# Patient Record
(demographics unavailable — no encounter records)

---

## 2024-11-13 NOTE — DISCUSSION/SUMMARY
[FreeTextEntry1] : Continue tacrolimus 0.03% ointment pea-size amt to areas of ELP, LS 1-2x/week. Instructed to apply lidocaine 5% ointment with tacrolimus  Lidocaine 5% ointment to area as needed -can use up to TID as needed (ie: after dilation) -apply 5 mins prior to dilation  Increase estrace ftp to introitus 5x/week (nights not using vagifem).  Continue Vagifem 10mcg 1 tab PV to 1-2x/week Continue hydrocortisone 25mg supp 1 PV 2x/month prn only. May use clobetasol 1-2x/week.  Encouraged to resume dilation therapy with #2 at this time and dilate 3-4x/week as tolerated. May use topical lidocaine prior to and after dilation.  RTO 6 months

## 2024-11-13 NOTE — HISTORY OF PRESENT ILLNESS
[FreeTextEntry1] : Dali is here for a f/u visit. She reports some improvement in her ELP and vLS. She is using vagifem PV 3x/week, estrace ftp to introitus 3-4x/weel, tacrolimus 1-2x/week and hydrocortisone suppositories PV 2x/month as needed only.  She continues to report irritation after tacrolimus application x 1-2d. She is not using hyaluronic acid 5mg suppositories.   She has only dilated x 1-2 since her last visit.  Vaginal dryness improved. She enodrses mild irritation b/t 4-6 o'clock. She denies vLS or ELP flares.  Dali had TV and abdominal US - wnl

## 2024-11-13 NOTE — PHYSICAL EXAM
[No Acute Distress] : in no acute distress [Well developed] : well developed [Well Nourished] : ~L well nourished [Good Hygeine] : demonstrates good hygeine [Oriented x3] : oriented to person, place, and time [Normal Memory] : ~T memory was ~L unimpaired [Normal Mood/Affect] : mood and affect are normal [Warm and Dry] : was warm and dry to touch [Normal Gait] : gait was normal [No Lesions] : no lesions were seen on the external genitalia [Vulvar Atrophy] : vulvar atrophy [Normal] : was normal [Normal Appearance] : general appearance was normal [Dry Mucosa] : dry mucosa [Rectocele] : a rectocele [No Bleeding] : there was no active vaginal bleeding [Exam Deferred] : was deferred [de-identified] : Q-tip touch test negative. +paleness at introitus, +mucosal web, non-tender. Mild erythema at inferior aspect of introitus at 4-6 o'clock. No ulcerations, erosions, fissures. Derm changes c/w LS and ELP however has significantly improved.  [de-identified] : Derm changes c/w LS. 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  [FreeTextEntry4] : no active ELP; PFMs wnl, supple, non-tender to palpation; restrictive ring not appreciated.

## 2025-02-07 NOTE — HISTORY OF PRESENT ILLNESS
[FreeTextEntry1] : Patient with a history of hypothyroidism history of colon polyps and hemorrhoid  Patient has known history of GERD.  Gets epigastric pain worse in the morning.  We have tried famotidine 40 mg a day.  Last visit she was under control, but since last visit she is getting increasing epigastric pain.  Primary has changed her to pantoprazole 40 mg a day.  EGD in September 2024 showed gastritis.  Also showed gastric intestinal metaplasia.  Due for gastric mapping EGD in 1 year.  September 2025.  Patient was on Wegovy.  Noted that it gave her constipation.  Was having thin stools.  Tried MiraLAX but gives fecal urgency.  She is using MiraLAX 3 times a week and giving a bowel movement 3 times a week.  However she still complains of incomplete evacuation she was complaining of abdominal discomfort.  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  It also showed a 5 mm pancreatic cyst.  MRCP was negative for pancreatic cyst.  EGD September 2024 showed internal hemorrhoids.  She does have a remote history of colon polyps in 2022.

## 2025-02-07 NOTE — ASSESSMENT
[FreeTextEntry1] : A/P Patient with constipation, incomplete evacuation Will try MiraLAX half a capful every day instead of 3 times a day. If no improvement may consider Amitiza 8 mcg a day. History of colon polyps-last colonoscopy 2024 was negative for colon polyps.  Colonoscopy due 2029  Patient with epigastric pain, EGD September 2024 showing gastric intestinal metaplasia. She is now off Pepcid 40 mg and is on pantoprazole 40 mg a day. After her symptoms are controlled, will we will reconsider switching back to Pepcid.  She is concerned about her osteopenia. Will do EGD in September 2025 for gastric mapping Follow-up in 3 months

## 2025-05-21 NOTE — ASSESSMENT
[FreeTextEntry1] : Right knee. History of Mild pf oa. Success with Orthovisc in the past. Increased pain recently with no injury.  This is an exacerbation of a chronic condition.  Left knee pain. MRI left knee 2022 shows pf oa mild, some fat pad overgrowth. Success with Orthovisc in the past.  Increased pain recently with no injury.   Patient daughter getting  in two weeks and patient going to [place] mid June. history of sjogrens - seeing rheum

## 2025-05-21 NOTE — HISTORY OF PRESENT ILLNESS
[Dull/Aching] : dull/aching [Exercising] : exercising [6] : 6 [0] : 0 [de-identified] : 6/15/22:  bilateral knee pain ongoing but getting worse since may 2022.  no new injury or trauma.  no fevers or chills.  left knee worse than right.  pain worse with stairs, bending, shifting positions, twisting, walking.  reports to clicking and also buckling dharmesh of the left knee.  rest, ice, hep, otc meds prn with little relief.  7/20/22: still pain mostly left knee. 8/24/22: improved with hep and pt. 10/26/22:  bilateral knee pain still.  11/2/22:  knee pain continues.  no adverse reactions from first set.  11/9/22:  knee pain still. 11/16/22:  bilateral knee pain continues.  5/21/25: Follow up bilateral knees, was doing well for a while but increased pain recently with no injury.  [] : no [FreeTextEntry1] : yael knees [FreeTextEntry5] : NKI, pt reports having orthovisc injections in 2022 which seemed to help, since working out more she has developed pain again  [FreeTextEntry6] : weakness, buckling [FreeTextEntry9] : meloxicam, gel injections  [de-identified] : 2022

## 2025-05-21 NOTE — DISCUSSION/SUMMARY
[de-identified] : Injection(s) as noted in procedure narrative.  The patient has documented cartilage loss and osteoarthritis in one or more joints.  The patient has persistent symptoms despite attempts at treatment to this date including activity modification, medications and other treatments.  The patient is a candidate for Visco supplementation to treat the osteoarthritis.  The risks, benefits, alternatives and the nature of the treatment have been discussed with the patient including but not limited to infection, continued pain, and failure of treatment. We will request authorization to administer Visco supplementation therapy.  The following medication is requested. Orthovisc requested for bilateral knees.  The patient should perform a home exercise program as directed. The patient should focus on the body parts affected as discussed above.  The patient's orthopaedic condition(s) warrants intermittent use of a prescription strength non-steroidal anti-inflammatory medication (MOBIC 15MG po qday).  This medication is associated with risks including but not limited to gastrointestinal irritation, kidney damage, hypertension, and bleeding.  The patient understands and will take medication as prescribed.  The patient will stop the medication and consult a physician as needed if problems arise.  Follow up for gel injections.

## 2025-05-21 NOTE — PROCEDURE
[FreeTextEntry3] : The patient has continued pain despite attempts at treatment including but not limited to rest, ice, aspirin, Ibuprofen, Aleve, Tylenol etc or prescription NSAIDS, and/or exercises at home and/ or physical therapy without significant improvement.  As a result, a cortisone injection was performed in the office. The risks, benefits, and alternatives of a cortisone injection were explained in full to the patient. Risks outlined include but are not limited to infection, sepsis, bleeding, scarring, skin discoloration, temporary increase in pain, syncopal episode, failure to resolve symptoms, allergic reaction, symptom recurrence, and elevation of blood sugar in diabetics. The patient understood the risks. All questions were answered.   Oral informed consent was obtained.  The medication used was 3 cc of 1% Lidocaine, 3 cc of .5% Marcaine and 2 cc or 12 mg of Celestone.  The injection site was cleaned using betadine or alcohol to sterilize the area. Sterile technique was utilized for the procedure including the preparation of the solutions used for the injection. The medication was injected into the RIGHT KNEE JOINT.  A bandage or band aid was applied after the injection.  The patient tolerated the procedure well and was advised to ice the injection site this evening.  Other post procedure instructions were also discussed including to call if redness, pain, fever, or any other problems occur and to apply ice for 15 min. out of every hour for the next 12-24 hours as tolerated. The patient was advised to rest the joint(s) for 2 days.    The injection was performed using ultrasound guidance to confirm accurate placement of the needle into the appropriate position. Visualization of the needle and placement of the injection was performed without complication as noted above.

## 2025-05-21 NOTE — ASSESSMENT
[FreeTextEntry1] : Right knee. History of Mild pf oa. Success with Orthovisc in the past. Increased pain recently with no injury.  This is an exacerbation of a chronic condition.  Left knee pain. MRI left knee 2022 shows pf oa mild, some fat pad overgrowth. Success with Orthovisc in the past.  Increased pain recently with no injury.   Patient daughter getting  in two weeks and patient going to [place] mid June. history of sjogrens - seeing rheum

## 2025-05-21 NOTE — HISTORY OF PRESENT ILLNESS
[facility]    Cornel Evans MD  Hospitalist Service  [facility]  Securely message with [employer] (George Osorio MD;  Location: [facility]    0] : 0 [de-identified] : 6/15/22:  bilateral knee pain ongoing but getting worse since may 2022.  no new injury or trauma.  no fevers or chills.  left knee worse than right.  pain worse with stairs, bending, shifting positions, twisting, walking.  reports to clicking and also buckling dharmesh of the left knee.  rest, ice, hep, otc meds prn with little relief.  7/20/22: still pain mostly left knee. 8/24/22: improved with hep and pt. 10/26/22:  bilateral knee pain still.  11/2/22:  knee pain continues.  no adverse reactions from first set.  11/9/22:  knee pain still. 11/16/22:  bilateral knee pain continues.  5/21/25: Follow up bilateral knees, was doing well for a while but increased pain recently with no injury.  FreeTextEntry5] : NKI, pt reports having orthovisc injections in 2022 which seemed to help, since working out more she has developed pain again  [FreeTextEntry6] : weakness, buckling [FreeTextEntry9] : meloxicam, gel injections  [de-identified] : 2022

## 2025-05-21 NOTE — DISCUSSION/SUMMARY
[de-identified] : Injection(s) as noted in procedure narrative.  The patient has documented cartilage loss and osteoarthritis in one or more joints.  The patient has persistent symptoms despite attempts at treatment to this date including activity modification, medications and other treatments.  The patient is a candidate for Visco supplementation to treat the osteoarthritis.  The risks, benefits, alternatives and the nature of the treatment have been discussed with the patient including but not limited to infection, continued pain, and failure of treatment. We will request authorization to administer Visco supplementation therapy.  The following medication is requested. Orthovisc requested for bilateral knees.  The patient should perform a home exercise program as directed. The patient should focus on the body parts affected as discussed above.  The patient's orthopaedic condition(s) warrants intermittent use of a prescription strength non-steroidal anti-inflammatory medication (MOBIC 15MG po qday).  This medication is associated with risks including but not limited to gastrointestinal irritation, kidney damage, hypertension, and bleeding.  The patient understands and will take medication as prescribed.  The patient will stop the medication and consult a physician as needed if problems arise.  Follow up for gel injections.

## 2025-05-21 NOTE — PHYSICAL EXAM
[Positive] : positive Ary [Right] : right knee [NL (0)] : extension 0 degrees [5___] : hamstring 5[unfilled]/5 [Equivocal] : equivocal Ary [Left] : left knee [There are no fractures, subluxations or dislocations. No significant abnormalities are seen] : There are no fractures, subluxations or dislocations. No significant abnormalities are seen [] : non-antalgic [TWNoteComboBox7] : flexion 130 degrees

## 2025-06-04 NOTE — ASSESSMENT
[FreeTextEntry1] : Right knee. History of Mild pf oa. Success with Orthovisc in the past. Increased pain recently with no injury.  This is an exacerbation of a chronic condition.  Left knee pain. MRI left knee 2022 shows pf oa mild, some fat pad overgrowth. Success with Orthovisc in the past.  Increased pain recently with no injury.   Patient going to [place] mid June. history of sjogrens - seeing rheum

## 2025-06-04 NOTE — HISTORY OF PRESENT ILLNESS
[de-identified] : 6/15/22:  bilateral knee pain ongoing but getting worse since may 2022.  no new injury or trauma.  no fevers or chills.  left knee worse than right.  pain worse with stairs, bending, shifting positions, twisting, walking.  reports to clicking and also buckling dharmesh of the left knee.  rest, ice, hep, otc meds prn with little relief.  7/20/22: still pain mostly left knee. 8/24/22: improved with hep and pt. 10/26/22:  bilateral knee pain still.  11/2/22:  knee pain continues.  no adverse reactions from first set.  11/9/22:  knee pain still. 11/16/22:  bilateral knee pain continues.  5/21/25: Follow up bilateral knees, was doing well for a while but increased pain recently with no injury.  5/28/25: Bilateral knee pain persists.

## 2025-06-04 NOTE — DISCUSSION/SUMMARY
[de-identified] : Injection(s) as noted in procedure narrative.  The patient should perform a home exercise program as directed. The patient should focus on the body parts affected as discussed above.  The patient's orthopaedic condition(s) warrants consideration of consistent or intermittent use of a prescription strength non-steroidal anti-inflammatory medication (MOBIC 15MG po day prn).  This medication is associated with risks including but not limited to gastrointestinal irritation, kidney damage, hypertension, and bleeding. The patient notes they already have a valid prescription for the medication. The patient understands the risks and will take medication as prescribed previously.  The patient will stop the medication and consult a physician as needed if problems arise.  Follow up 1 week

## 2025-06-04 NOTE — PHYSICAL EXAM
[Left] : left knee [Positive] : positive Ary [Right] : right knee [NL (0)] : extension 0 degrees [5___] : hamstring 5[unfilled]/5 [Equivocal] : equivocal Ary [] : patient ambulates without assistive device [There are no fractures, subluxations or dislocations. No significant abnormalities are seen] : There are no fractures, subluxations or dislocations. No significant abnormalities are seen [TWNoteComboBox7] : flexion 130 degrees

## 2025-06-04 NOTE — HISTORY OF PRESENT ILLNESS
[de-identified] : 6/15/22:  bilateral knee pain ongoing but getting worse since may 2022.  no new injury or trauma.  no fevers or chills.  left knee worse than right.  pain worse with stairs, bending, shifting positions, twisting, walking.  reports to clicking and also buckling dharmesh of the left knee.  rest, ice, hep, otc meds prn with little relief.  7/20/22: still pain mostly left knee. 8/24/22: improved with hep and pt. 10/26/22:  bilateral knee pain still.  11/2/22:  knee pain continues.  no adverse reactions from first set.  11/9/22:  knee pain still. 11/16/22:  bilateral knee pain continues.  5/21/25: Follow up bilateral knees, was doing well for a while but increased pain recently with no injury.  5/28/25: Bilateral knee pain persists.

## 2025-06-04 NOTE — ASSESSMENT
[FreeTextEntry1] : Right knee. History of Mild pf oa. Success with Orthovisc in the past. Increased pain recently with no injury.  This is an exacerbation of a chronic condition.  Left knee pain. MRI left knee 2022 shows pf oa mild, some fat pad overgrowth. Success with Orthovisc in the past.  Increased pain recently with no injury.   Patient going to [place] mid June. history of sjogrens - seeing rheum

## 2025-06-04 NOTE — DISCUSSION/SUMMARY
[de-identified] : Injection(s) as noted in procedure narrative.  The patient should perform a home exercise program as directed. The patient should focus on the body parts affected as discussed above.  The patient's orthopaedic condition(s) warrants consideration of consistent or intermittent use of a prescription strength non-steroidal anti-inflammatory medication (MOBIC 15MG po day prn).  This medication is associated with risks including but not limited to gastrointestinal irritation, kidney damage, hypertension, and bleeding. The patient notes they already have a valid prescription for the medication. The patient understands the risks and will take medication as prescribed previously.  The patient will stop the medication and consult a physician as needed if problems arise.  Follow up 1 week

## 2025-06-04 NOTE — PROCEDURE
[FreeTextEntry3] : The patient has continued pain from osteoarthritis despite attempts at treatment including but not limited to rest, ice, aspirin, Ibuprofen, Aleve, Tylenol etc or prescription NSAIDS, and/or exercises at home and/ or physical therapy without significant improvement.  As a result, a viscosupplementation injection was performed in the office. The risks, benefits, and alternatives of viscosupplementation were explained in full to the patient. Risks outlined include but are not limited to infection, sepsis, bleeding, scarring, skin discoloration, temporary increase in pain, syncopal episode, failure to resolve symptoms, allergic reaction, and symptom recurrence. The patient understood the risks. All questions were answered.  Oral informed consent was obtained.  The medication used was ORTHOVISC (15 mg / 2 cc).  This was injection #2 in the series.  The injection site was cleaned using betadine or alcohol to sterilize the area. Sterile technique was utilized for the procedure including the preparation of the solutions used for the injection. The medication was injected into BILATERAL KNEE JOINTS.  A bandage or band aid was applied after the injection.  The patient tolerated the procedure well and was advised to ice the injection site this evening.  Other post procedure instructions were also discussed including to call if redness, pain, fever, or any other problems occur and to apply ice for 15 min. out of every hour for the next 12-24 hours as tolerated. The patient was advised to rest the joint(s) for 2 days..    The injection was performed using ultrasound guidance to confirm accurate placement of the needle into the appropriate location. Visualization of the needle and placement of the injection was performed without complication as noted above.

## 2025-06-05 NOTE — PHYSICAL EXAM
[Left] : left knee [Positive] : positive Ary [Right] : right knee [NL (0)] : extension 0 degrees [5___] : hamstring 5[unfilled]/5 [Equivocal] : equivocal Ary [There are no fractures, subluxations or dislocations. No significant abnormalities are seen] : There are no fractures, subluxations or dislocations. No significant abnormalities are seen [] : non-antalgic [TWNoteComboBox7] : flexion 130 degrees

## 2025-06-05 NOTE — DISCUSSION/SUMMARY
[de-identified] : The patient has documented cartilage loss and osteoarthritis in one or more joints.  The patient has persistent symptoms despite attempts at treatment to this date including activity modification, medications and other treatments.  The patient is a candidate for Visco supplementation to treat the osteoarthritis.  The risks, benefits, alternatives and the nature of the treatment have been discussed with the patient including but not limited to infection, continued pain, and failure of treatment. The treatment has been authorized and the patient's clinical picture on this visit warrants moving forward with the proposed treatment.  We will begin the series today.  Injection(s) as noted in procedure narrative.  The patient should perform a home exercise program as directed. The patient should focus on the body parts affected as discussed above.  The patient's orthopaedic condition(s) warrants intermittent use of a prescription strength non-steroidal anti-inflammatory medication (MOBIC 15MG po qday).  This medication is associated with risks including but not limited to gastrointestinal irritation, kidney damage, hypertension, and bleeding.  The patient understands and will take medication as prescribed.  The patient will stop the medication and consult a physician as needed if problems arise.  Follow up 1 week

## 2025-06-05 NOTE — ASSESSMENT
[FreeTextEntry1] : Right knee. History of Mild pf oa. Success with Orthovisc in the past. Increased pain recently with no injury.  This is an exacerbation of a chronic condition.  Left knee pain. MRI left knee 2022 shows pf oa mild, some fat pad overgrowth. Success with Orthovisc in the past.  Increased pain recently with no injury.   Patient going to [place] mid June. history of sjogrens - seeing rheum

## 2025-06-05 NOTE — HISTORY OF PRESENT ILLNESS
[6] : 6 [0] : 0 [Dull/Aching] : dull/aching [Exercising] : exercising [1] : 1 [Orthovisc] : Orthovisc [de-identified] : 6/15/22:  bilateral knee pain ongoing but getting worse since may 2022.  no new injury or trauma.  no fevers or chills.  left knee worse than right.  pain worse with stairs, bending, shifting positions, twisting, walking.  reports to clicking and also buckling dharmesh of the left knee.  rest, ice, hep, otc meds prn with little relief.  7/20/22: still pain mostly left knee. 8/24/22: improved with hep and pt. 10/26/22:  bilateral knee pain still.  11/2/22:  knee pain continues.  no adverse reactions from first set.  11/9/22:  knee pain still. 11/16/22:  bilateral knee pain continues.  5/21/25: Follow up bilateral knees, was doing well for a while but increased pain recently with no injury.  5/28/25: Bilateral knee pain persists. [] : no [FreeTextEntry1] : yael knees [FreeTextEntry5] : NKI, pt reports having orthovisc injections in 2022 which seemed to help, since working out more she has developed pain again  [FreeTextEntry6] : weakness, buckling [FreeTextEntry9] : meloxicam, gel injections  [de-identified] : 2022

## 2025-06-05 NOTE — PROCEDURE
[FreeTextEntry3] : The patient has continued pain from osteoarthritis despite attempts at treatment including but not limited to rest, ice, aspirin, Ibuprofen, Aleve, Tylenol etc or prescription NSAIDS, and/or exercises at home and/ or physical therapy without significant improvement.  As a result, a viscosupplementation injection was performed in the office. The risks, benefits, and alternatives of viscosupplementation were explained in full to the patient. Risks outlined include but are not limited to infection, sepsis, bleeding, scarring, skin discoloration, temporary increase in pain, syncopal episode, failure to resolve symptoms, allergic reaction, and symptom recurrence. The patient understood the risks. All questions were answered.  Oral informed consent was obtained.  The medication used was ORTHOVISC (15 mg / 2 cc).  This was injection #1 in the series.  The injection site was cleaned using betadine or alcohol to sterilize the area. Sterile technique was utilized for the procedure including the preparation of the solutions used for the injection. The medication was injected into BILATERAL KNEE JOINTS.  A bandage or band aid was applied after the injection.  The patient tolerated the procedure well and was advised to ice the injection site this evening.  Other post procedure instructions were also discussed including to call if redness, pain, fever, or any other problems occur and to apply ice for 15 min. out of every hour for the next 12-24 hours as tolerated. The patient was advised to rest the joint(s) for 2 days..    The injection was performed using ultrasound guidance to confirm accurate placement of the needle into the appropriate location. Visualization of the needle and placement of the injection was performed without complication as noted above.

## 2025-06-05 NOTE — DISCUSSION/SUMMARY
[de-identified] : The patient has documented cartilage loss and osteoarthritis in one or more joints.  The patient has persistent symptoms despite attempts at treatment to this date including activity modification, medications and other treatments.  The patient is a candidate for Visco supplementation to treat the osteoarthritis.  The risks, benefits, alternatives and the nature of the treatment have been discussed with the patient including but not limited to infection, continued pain, and failure of treatment. The treatment has been authorized and the patient's clinical picture on this visit warrants moving forward with the proposed treatment.  We will begin the series today.  Injection(s) as noted in procedure narrative.  The patient should perform a home exercise program as directed. The patient should focus on the body parts affected as discussed above.  The patient's orthopaedic condition(s) warrants intermittent use of a prescription strength non-steroidal anti-inflammatory medication (MOBIC 15MG po qday).  This medication is associated with risks including but not limited to gastrointestinal irritation, kidney damage, hypertension, and bleeding.  The patient understands and will take medication as prescribed.  The patient will stop the medication and consult a physician as needed if problems arise.  Follow up 1 week

## 2025-06-05 NOTE — HISTORY OF PRESENT ILLNESS
[6] : 6 [0] : 0 [Dull/Aching] : dull/aching [Exercising] : exercising [1] : 1 [Orthovisc] : Orthovisc [de-identified] : 6/15/22:  bilateral knee pain ongoing but getting worse since may 2022.  no new injury or trauma.  no fevers or chills.  left knee worse than right.  pain worse with stairs, bending, shifting positions, twisting, walking.  reports to clicking and also buckling dharmesh of the left knee.  rest, ice, hep, otc meds prn with little relief.  7/20/22: still pain mostly left knee. 8/24/22: improved with hep and pt. 10/26/22:  bilateral knee pain still.  11/2/22:  knee pain continues.  no adverse reactions from first set.  11/9/22:  knee pain still. 11/16/22:  bilateral knee pain continues.  5/21/25: Follow up bilateral knees, was doing well for a while but increased pain recently with no injury.  5/28/25: Bilateral knee pain persists. [] : no [FreeTextEntry1] : yael knees [FreeTextEntry5] : NKI, pt reports having orthovisc injections in 2022 which seemed to help, since working out more she has developed pain again  [FreeTextEntry6] : weakness, buckling [FreeTextEntry9] : meloxicam, gel injections  [de-identified] : 2022

## 2025-06-05 NOTE — ASSESSMENT
[FreeTextEntry1] : Right knee. History of Mild pf oa. Success with Orthovisc in the past. Increased pain recently with no injury.  This is an exacerbation of a chronic condition.  Left knee pain. MRI left knee 2022 shows pf oa mild, some fat pad overgrowth. Success with Orthovisc in the past.  Increased pain recently with no injury.   Patient going to [place] mid June. history of sjogrens - seeing rheum 97

## 2025-06-12 NOTE — ASSESSMENT
[FreeTextEntry1] : Right knee. History of Mild pf oa. Success with Orthovisc in the past. Increased pain recently with no injury.  This is an exacerbation of a chronic condition.  Left knee pain. MRI left knee 2022 shows pf oa mild, some fat pad overgrowth. Success with Orthovisc in the past.  IRecurrent pain.  Patient going to [place] mid June. history of sjogrens - seeing rheum

## 2025-06-12 NOTE — HISTORY OF PRESENT ILLNESS
[3] : 3 [Orthovisc] : Orthovisc [de-identified] : 6/15/22:  bilateral knee pain ongoing but getting worse since may 2022.  no new injury or trauma.  no fevers or chills.  left knee worse than right.  pain worse with stairs, bending, shifting positions, twisting, walking.  reports to clicking and also buckling dharmesh of the left knee.  rest, ice, hep, otc meds prn with little relief.  7/20/22: still pain mostly left knee. 8/24/22: improved with hep and pt. 10/26/22:  bilateral knee pain still.  11/2/22:  knee pain continues.  no adverse reactions from first set.  11/9/22:  knee pain still. 11/16/22:  bilateral knee pain continues.  5/21/25: Follow up bilateral knees, was doing well for a while but increased pain recently with no injury.  5/28/25: Bilateral knee pain persists. 6/11/25:  Bilateral knee pain persists.

## 2025-06-12 NOTE — PROCEDURE
[FreeTextEntry3] : The patient has continued pain from osteoarthritis despite attempts at treatment including but not limited to rest, ice, aspirin, Ibuprofen, Aleve, Tylenol etc or prescription NSAIDS, and/or exercises at home and/ or physical therapy without significant improvement.  As a result, a viscosupplementation injection was performed in the office. The risks, benefits, and alternatives of viscosupplementation were explained in full to the patient. Risks outlined include but are not limited to infection, sepsis, bleeding, scarring, skin discoloration, temporary increase in pain, syncopal episode, failure to resolve symptoms, allergic reaction, and symptom recurrence. The patient understood the risks. All questions were answered.  Oral informed consent was obtained.  The medication used was ORTHOVISC (15 mg / 2 cc).  This was injection #3 in the series.  The injection site was cleaned using betadine or alcohol to sterilize the area. Sterile technique was utilized for the procedure including the preparation of the solutions used for the injection. The medication was injected into BILATERAL KNEE JOINTS.  A bandage or band aid was applied after the injection.  The patient tolerated the procedure well and was advised to ice the injection site this evening.  Other post procedure instructions were also discussed including to call if redness, pain, fever, or any other problems occur and to apply ice for 15 min. out of every hour for the next 12-24 hours as tolerated. The patient was advised to rest the joint(s) for 2 days..    The injection was performed using ultrasound guidance to confirm accurate placement of the needle into the appropriate location. Visualization of the needle and placement of the injection was performed without complication as noted above.

## 2025-06-12 NOTE — ASSESSMENT
[FreeTextEntry1] : Right knee. History of Mild pf oa. Success with Orthovisc in the past. Increased pain recently with no injury.  This is an exacerbation of a chronic condition.  Left knee pain. MRI left knee 2022 shows pf oa mild, some fat pad overgrowth. Success with Orthovisc in the past.  IRecurrent pain.  Patient going to [place] mid June. history of sjogrens - seeing rheum

## 2025-06-12 NOTE — HISTORY OF PRESENT ILLNESS
[3] : 3 [Orthovisc] : Orthovisc [de-identified] : 6/15/22:  bilateral knee pain ongoing but getting worse since may 2022.  no new injury or trauma.  no fevers or chills.  left knee worse than right.  pain worse with stairs, bending, shifting positions, twisting, walking.  reports to clicking and also buckling dharmesh of the left knee.  rest, ice, hep, otc meds prn with little relief.  7/20/22: still pain mostly left knee. 8/24/22: improved with hep and pt. 10/26/22:  bilateral knee pain still.  11/2/22:  knee pain continues.  no adverse reactions from first set.  11/9/22:  knee pain still. 11/16/22:  bilateral knee pain continues.  5/21/25: Follow up bilateral knees, was doing well for a while but increased pain recently with no injury.  5/28/25: Bilateral knee pain persists. 6/11/25:  Bilateral knee pain persists.

## 2025-07-03 NOTE — DISCUSSION/SUMMARY
[de-identified] : The patient has completed the series of injections for Visco Supplementation on this date.  Based on the current evaluation of the patient, the patient should follow the treatment plan as outlined below.  The patient should perform a home exercise program as directed. The patient should focus on the body parts affected as discussed above.  The patient's orthopaedic condition(s) warrants consideration of consistent or intermittent use of a prescription strength non-steroidal anti-inflammatory medication (MOBIC 15MG po day prn).  This medication is associated with risks including but not limited to gastrointestinal irritation, kidney damage, hypertension, and bleeding. The patient notes they already have a valid prescription for the medication. The patient understands the risks and will take medication as prescribed previously.  The patient will stop the medication and consult a physician as needed if problems arise.  Injection(s) as noted in procedure narrative.  Follow up 6 weeks if pain persists / prn, timing and frequency of repeat series reviewed

## 2025-07-03 NOTE — PROCEDURE
[FreeTextEntry3] : The patient has continued pain from osteoarthritis despite attempts at treatment including but not limited to rest, ice, aspirin, Ibuprofen, Aleve, Tylenol etc or prescription NSAIDS, and/or exercises at home and/ or physical therapy without significant improvement.  As a result, a viscosupplementation injection was performed in the office. The risks, benefits, and alternatives of viscosupplementation were explained in full to the patient. Risks outlined include but are not limited to infection, sepsis, bleeding, scarring, skin discoloration, temporary increase in pain, syncopal episode, failure to resolve symptoms, allergic reaction, and symptom recurrence. The patient understood the risks. All questions were answered.  Oral informed consent was obtained.  The medication used was ORTHOVISC (15 mg / 2 cc).  This was injection #4 in the series.  The injection site was cleaned using betadine or alcohol to sterilize the area. Sterile technique was utilized for the procedure including the preparation of the solutions used for the injection. The medication was injected into BILATERAL KNEE JOINTS.  A bandage or band aid was applied after the injection.  The patient tolerated the procedure well and was advised to ice the injection site this evening.  Other post procedure instructions were also discussed including to call if redness, pain, fever, or any other problems occur and to apply ice for 15 min. out of every hour for the next 12-24 hours as tolerated. The patient was advised to rest the joint(s) for 2 days..    The injection was performed using ultrasound guidance to confirm accurate placement of the needle into the appropriate location. Visualization of the needle and placement of the injection was performed without complication as noted above.

## 2025-07-03 NOTE — HISTORY OF PRESENT ILLNESS
[4] : 4 [Orthovisc] : Orthovisc [de-identified] : 6/15/22:  bilateral knee pain ongoing but getting worse since may 2022.  no new injury or trauma.  no fevers or chills.  left knee worse than right.  pain worse with stairs, bending, shifting positions, twisting, walking.  reports to clicking and also buckling dharmesh of the left knee.  rest, ice, hep, otc meds prn with little relief.  7/20/22: still pain mostly left knee. 8/24/22: improved with hep and pt. 10/26/22:  bilateral knee pain still.  11/2/22:  knee pain continues.  no adverse reactions from first set.  11/9/22:  knee pain still. 11/16/22:  bilateral knee pain continues.  5/21/25: Follow up bilateral knees, was doing well for a while but increased pain recently with no injury.  5/28/25: Bilateral knee pain persists. 6/11/25:  Bilateral knee pain persists. 7/2/25: Bilateral knee R>L pain, notes improvement in symptoms.

## 2025-07-03 NOTE — DISCUSSION/SUMMARY
[de-identified] : The patient has completed the series of injections for Visco Supplementation on this date.  Based on the current evaluation of the patient, the patient should follow the treatment plan as outlined below.  The patient should perform a home exercise program as directed. The patient should focus on the body parts affected as discussed above.  The patient's orthopaedic condition(s) warrants consideration of consistent or intermittent use of a prescription strength non-steroidal anti-inflammatory medication (MOBIC 15MG po day prn).  This medication is associated with risks including but not limited to gastrointestinal irritation, kidney damage, hypertension, and bleeding. The patient notes they already have a valid prescription for the medication. The patient understands the risks and will take medication as prescribed previously.  The patient will stop the medication and consult a physician as needed if problems arise.  Injection(s) as noted in procedure narrative.  Follow up 6 weeks if pain persists / prn, timing and frequency of repeat series reviewed

## 2025-07-03 NOTE — ASSESSMENT
[FreeTextEntry1] : Right knee. History of Mild pf oa. Success with Orthovisc in the past. Increased pain recently with no injury.    Left knee pain. MRI left knee 2022 shows pf oa mild, some fat pad overgrowth. Success with Orthovisc in the past.  Recurrent pain.  history of sjogrens - seeing rheum

## 2025-07-03 NOTE — HISTORY OF PRESENT ILLNESS
[4] : 4 [Orthovisc] : Orthovisc [de-identified] : 6/15/22:  bilateral knee pain ongoing but getting worse since may 2022.  no new injury or trauma.  no fevers or chills.  left knee worse than right.  pain worse with stairs, bending, shifting positions, twisting, walking.  reports to clicking and also buckling dharmesh of the left knee.  rest, ice, hep, otc meds prn with little relief.  7/20/22: still pain mostly left knee. 8/24/22: improved with hep and pt. 10/26/22:  bilateral knee pain still.  11/2/22:  knee pain continues.  no adverse reactions from first set.  11/9/22:  knee pain still. 11/16/22:  bilateral knee pain continues.  5/21/25: Follow up bilateral knees, was doing well for a while but increased pain recently with no injury.  5/28/25: Bilateral knee pain persists. 6/11/25:  Bilateral knee pain persists. 7/2/25: Bilateral knee R>L pain, notes improvement in symptoms.

## 2025-07-18 NOTE — PROCEDURE
[Other ___] : [unfilled] [Patient] : the patient [Consent Obtained] : written consent was obtained prior to the procedure and is detailed in the patient's record [None] : none [No] : Specimen not sent to Pathology [No Complications] : none [Tolerated Well] : the patient tolerated the procedure well [Post procedure instructions and information given] : post procedure instructions and information given and reviewed with patient. [0] : 0 [FreeTextEntry1] : SEE NOTE BELOW SEE PHOTOS IN EHR

## 2025-07-18 NOTE — PHYSICAL EXAM
[No Acute Distress] : in no acute distress [Well developed] : well developed [Well Nourished] : ~L well nourished [Good Hygeine] : demonstrates good hygeine [Oriented x3] : oriented to person, place, and time [Normal Memory] : ~T memory was ~L unimpaired [Normal Mood/Affect] : mood and affect are normal [Warm and Dry] : was warm and dry to touch [Normal Gait] : gait was normal [No Lesions] : no lesions were seen on the external genitalia [Vulvar Atrophy] : vulvar atrophy [Normal] : was normal [Normal Appearance] : general appearance was normal [Dry Mucosa] : dry mucosa [Rectocele] : a rectocele [No Bleeding] : there was no active vaginal bleeding [Exam Deferred] : was deferred [de-identified] : Q-tip touch test negative. +paleness at introitus, +mucosal web, non-tender.  No ulcerations, erosions, fissures. Derm changes c/w LS and ELP however has significantly improved.  SEE PHOTOS IN EHR [de-identified] : Derm changes c/w LS. 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 SEE PHOTOS IN EHR [FreeTextEntry4] : no active ELP; PFMs wnl, supple, non-tender to palpation; restrictive ring not appreciated.

## 2025-07-18 NOTE — HISTORY OF PRESENT ILLNESS
[FreeTextEntry1] : Dali is here for a f/u visit. She reports significant improvement in her ELP and vLS. She saw an oral surgeon who put her on doxycycline 40mg BID for oral LP and it is helping her vELP, vLS. She has not had any further flares. She denies irritation, burning, itching.  She is only using hydrocortisone suppositories PV 1-2x/month only. She has not been using vagifem, estrace, or tacrolimus since starting doxycycline. She continues to report irritation after tacrolimus application x 1-2d. She is not using hyaluronic acid 5mg suppositories. Continues to report vaginal dryness.  She has not dilated since her last visit.  Dali had TV and complete pelvic US in April 2024 which was wnl

## 2025-07-18 NOTE — DISCUSSION/SUMMARY
[FreeTextEntry1] : Vulvoscopy done in office today. vLS and ELP significantly improved compared to previous exams. SEE PHOTOS IN EHR. Vulvar biopsy not done today.   D/C tacrolimus 0.03%  D/C hydrocortisone suppositories  Lidocaine 5% ointment to area as needed -can use up to TID as needed (ie: after dilation) -apply 5 mins prior to dilation  Restart estrace ftp to introitus 5x/week (nights not using vagifem).  Restart Vagifem 10mcg 1 tab PV to 2x/week Restart clobetasol 1-2x/week.  Encouraged to resume dilation therapy with #2 at this time and dilate 3-4x/week as tolerated. May use topical lidocaine prior to and after dilation.  HA 5mg suppositories PV 2-3x/week.  Continue doxycycline as per oral surgeon.  RTO 6 months

## 2025-07-28 NOTE — DISCUSSION/SUMMARY
[FreeTextEntry1] : Pap smear is performed.  Prescription for mammogram and DEXA scan was provided.  Regarding her lichen planus patient sees NP at urogynecology.

## 2025-07-28 NOTE — PHYSICAL EXAM
[Chaperoned Physical Exam] : A chaperone was present in the examining room during all aspects of the physical examination. [MA] : MA [FreeTextEntry2] : bryan posey [Appropriately responsive] : appropriately responsive [Alert] : alert [No Acute Distress] : no acute distress [No Lymphadenopathy] : no lymphadenopathy [Regular Rate Rhythm] : regular rate rhythm [No Murmurs] : no murmurs [Clear to Auscultation B/L] : clear to auscultation bilaterally [Soft] : soft [Non-tender] : non-tender [Non-distended] : non-distended [No HSM] : No HSM [No Lesions] : no lesions [No Mass] : no mass [Oriented x3] : oriented x3 [Examination Of The Breasts] : a normal appearance [No Masses] : no breast masses were palpable [Labia Majora] : normal [Labia Minora] : normal [Normal] : normal [Uterine Adnexae] : normal

## 2025-07-28 NOTE — HISTORY OF PRESENT ILLNESS
[FreeTextEntry1] : 60-year-old white female para 2 presents for annual visit.  She is here for well visit.  She is currently not sexually active because she has a history of lichen planus in the vaginal area and is currently on oral doxycycline as well as clobetasol for this.  She is noticing a marked improvement with only doxycycline.  She also occasionally uses Vagifem.  Medical history of hypothyroidism.  Surgical history of 1 , hernia repair and a tubal ligation.  OB history significant for 1  and subsequent .  She is .  She denies alcohol tobacco or drug use.  Patient is due for mammogram and DEXA scan.  Her last DEXA scan was 2 years ago showing osteopenia.  Patient is on vitamin D supplementation now.